# Patient Record
Sex: FEMALE | Race: ASIAN | Employment: UNEMPLOYED | URBAN - METROPOLITAN AREA
[De-identification: names, ages, dates, MRNs, and addresses within clinical notes are randomized per-mention and may not be internally consistent; named-entity substitution may affect disease eponyms.]

---

## 2020-09-30 ENCOUNTER — TRANSFERRED RECORDS (OUTPATIENT)
Dept: HEALTH INFORMATION MANAGEMENT | Facility: CLINIC | Age: 25
End: 2020-09-30

## 2020-10-09 ENCOUNTER — MEDICAL CORRESPONDENCE (OUTPATIENT)
Dept: HEALTH INFORMATION MANAGEMENT | Facility: CLINIC | Age: 25
End: 2020-10-09

## 2020-10-09 ENCOUNTER — TRANSFERRED RECORDS (OUTPATIENT)
Dept: HEALTH INFORMATION MANAGEMENT | Facility: CLINIC | Age: 25
End: 2020-10-09

## 2020-12-27 ENCOUNTER — TRANSCRIBE ORDERS (OUTPATIENT)
Dept: OTHER | Age: 25
End: 2020-12-27

## 2020-12-27 DIAGNOSIS — D66 FACTOR VIII DEFICIENCY (H): Primary | ICD-10-CM

## 2021-01-07 ENCOUNTER — VIRTUAL VISIT (OUTPATIENT)
Dept: HEMATOLOGY | Facility: CLINIC | Age: 26
End: 2021-01-07
Attending: PHYSICIAN ASSISTANT
Payer: COMMERCIAL

## 2021-01-07 VITALS — WEIGHT: 175 LBS

## 2021-01-07 DIAGNOSIS — D66 FACTOR VIII DEFICIENCY (H): ICD-10-CM

## 2021-01-07 PROCEDURE — 99202 OFFICE O/P NEW SF 15 MIN: CPT | Mod: 95 | Performed by: PHYSICIAN ASSISTANT

## 2021-01-07 PROCEDURE — 999N001193 HC VIDEO/TELEPHONE VISIT; NO CHARGE

## 2021-01-07 NOTE — PROGRESS NOTES
Patient was contacted to complete the pre-visit call prior to their video visit with the provider.  The following statement was read:       This visit will be billed to your insurance the same as an in-person visit. Because of Coronavirus we are instituting video visits when possible to keep everyone safe. This video visit will be conducted between you and the provider.  This service lets us provide the care you need with a video conversation.  If a prescription is necessary, we can send it directly to your pharmacy.If lab work or other testing is needed, we can help arrange a place/time for that to be done at a later date.If during the course of the call the provider feels a video visit is not appropriate, then your insurance company will not be billed.       Allergies and medications were reviewed and travel screening complete.     I thanked them for their time to cover this information     Salomón Leblanc CMA

## 2021-01-07 NOTE — PROGRESS NOTES
[facility]  [facility]  [facility], [place] [address]  Main: [phone], Fax: [phone]      Patient: Liam Patricio  MRN: [id number]  : 1995  KAREN: 2021  Video visit done today due to COVID-19.  Patient at home and accessed through [employer], provider in clinic.  Start time: 9:00am, end time 9:25am.    Reason:  Evaluation of abnormal coagulation testing done through [facility] for easy bruising.    HPI:  Liam is a 24 yo female who was recently evaluated for bruising.  She states she has had easy bruising her whole life and recently she noted increase in bruising in places where she should not have bruises like her hands and legs from wearing tight pants. She discussed this with her primary care provider who ordered labs which show borderline von Willebrand levels and low factor VIII level.      She states she has not had bleeding issues in her life.  No nosebleeds, no gum bleeding. Her periods have always been irregular and at times heavy.  She was on oral estrogen to control her periods for about a year and this helped control her cycles.  She had wisdom teeth out and states she felt she did not bleed more and expected.  She has had no surgeries or injuries.      Past Medical History:  No past medical history on file.    Past Surgical History:  No past surgical history on file.    Medications:  No current outpatient medications on file.      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  She was born in [place] and then lived in [place] and [place] before moving to [place].     Family History:  States she has an older and younger brother.  Neither have bleeding issues.  Older brother had knee surgery without bleeding.  Mother had two  deliveries and did not report extra bleeding.     Objective:  Video visit done today due to COVID-19.   No exam done  today    Labs:  Review of outside labs:  CBC: WBC: 5.2, hgb: 13.3, platelet count: 248  10/1/2020: INR 1.0, PT 9.8  PTT: 36 (N= 22-34), factor VIII: 32%, Von Willebrand antigen: 53%, Ristocetin: 56%  TT: 17 sec, Factor IX: 90%, factor XI: 101%, Factor XII: 80%    Assessment:  Liam is a 24 yo female with history of lifelong easy bruising without other bleeding issues.  She denies other bleeding issues but has had limited hemostatic challenges.  No reported family history of bleeding disorder or bleeding issues.    Labs done show low factor VIII level with borderline von Willebrand levels. Differential is: mild hemophilia A vs. Type 2N von Willebrand disease.  Also possible repeat labs will be in the normal range.    Plan:  1. Will repeat von Willebrand testing and add factor VIII binding assay to rule out type 2N von Willebrand disease.  Lab appointment made for 1/12/2021  2. Follow-up appointment made for 2/11/2021 to discuss results.  Briefly discussed therapy options for bleeding disorders and also reviewed that her bleeding phenotype is very mild and she should not need therapy unless has surgery or major injury.    3. Reviewed that estrogen can increase factor VIII/von Willebrand levels and thus may be helpful to her to control menstrual cycles both from a regulatory standpoint and by increasing clotting factors.     Patient understands and agrees with the above plan and recommendation.    Total Time Spent:  25 minutes, all 25 minutes was spent on video visit discussing recent abnormal lab testing.        Nadja Ward MPH, PA-C  [facility]  [facility]  [phone] main line  [phone] pager  [phone] fax

## 2021-01-09 ENCOUNTER — HEALTH MAINTENANCE LETTER (OUTPATIENT)
Age: 26
End: 2021-01-09

## 2021-01-12 DIAGNOSIS — D66 FACTOR VIII DEFICIENCY (H): ICD-10-CM

## 2021-01-12 LAB
ERYTHROCYTE [DISTWIDTH] IN BLOOD BY AUTOMATED COUNT: 11.6 % (ref 10–15)
HCT VFR BLD AUTO: 38.6 % (ref 35–47)
HGB BLD-MCNC: 12.3 G/DL (ref 11.7–15.7)
MCH RBC QN AUTO: 30.4 PG (ref 26.5–33)
MCHC RBC AUTO-ENTMCNC: 31.9 G/DL (ref 31.5–36.5)
MCV RBC AUTO: 95 FL (ref 78–100)
MISCELLANEOUS TEST: NORMAL
PLATELET # BLD AUTO: 204 10E9/L (ref 150–450)
RBC # BLD AUTO: 4.05 10E12/L (ref 3.8–5.2)
WBC # BLD AUTO: 3.5 10E9/L (ref 4–11)

## 2021-01-12 PROCEDURE — 85027 COMPLETE CBC AUTOMATED: CPT | Performed by: PATHOLOGY

## 2021-01-12 PROCEDURE — 85246 CLOT FACTOR VIII VW ANTIGEN: CPT | Mod: 90 | Performed by: PATHOLOGY

## 2021-01-12 PROCEDURE — 85240 CLOT FACTOR VIII AHG 1 STAGE: CPT | Mod: 90 | Performed by: PATHOLOGY

## 2021-01-12 PROCEDURE — 85390 FIBRINOLYSINS SCREEN I&R: CPT | Mod: 90 | Performed by: PATHOLOGY

## 2021-01-12 PROCEDURE — 84999 UNLISTED CHEMISTRY PROCEDURE: CPT | Mod: 90 | Performed by: PATHOLOGY

## 2021-01-12 PROCEDURE — 36415 COLL VENOUS BLD VENIPUNCTURE: CPT | Performed by: PATHOLOGY

## 2021-01-12 PROCEDURE — 85245 CLOT FACTOR VIII VW RISTOCTN: CPT | Mod: 90 | Performed by: PATHOLOGY

## 2021-01-13 LAB
FACT VIII ACT/NOR PPP: 76 % (ref 55–200)
VON WILLEBRAND INTERPRETATION: NORMAL
VWF CBA/VWF AG PPP IA-RTO: 63 % (ref 50–200)
VWF:AC ACT/NOR PPP IA: 66 % (ref 50–180)

## 2021-01-22 LAB — LAB SCANNED RESULT: NORMAL

## 2021-02-11 ENCOUNTER — VIRTUAL VISIT (OUTPATIENT)
Dept: HEMATOLOGY | Facility: CLINIC | Age: 26
End: 2021-02-11
Attending: PHYSICIAN ASSISTANT
Payer: COMMERCIAL

## 2021-02-11 VITALS — WEIGHT: 170 LBS

## 2021-02-11 DIAGNOSIS — D66 FACTOR VIII DEFICIENCY (H): Primary | ICD-10-CM

## 2021-02-11 PROCEDURE — 99212 OFFICE O/P EST SF 10 MIN: CPT | Mod: 95 | Performed by: PHYSICIAN ASSISTANT

## 2021-02-11 NOTE — PROGRESS NOTES
Patient was contacted to complete the pre-visit call prior to their video visit with the provider.      Allergies and medications were reviewed.    I thanked them for their time to cover this information     Salomón Leblanc CMA

## 2021-02-11 NOTE — PROGRESS NOTES
[facility]  [facility]otting [facility]  [facility], [address], [place] [address]  Main: [phone], Fax: [phone]      Patient: Liam Patricio  MRN: [id number]  : 1995  KAREN: 2021  Video visit done today due to COVID-19.  Patient at home and accessed through [employer], provider in clinic.  Start time: noon, end time 12:15pm.    Reason:  Evaluation of abnormal coagulation testing done through [facility] for easy bruising.  Follow-up visit to discuss lab results.    HPI:  Liam is a 24 yo female who was recently evaluated for bruising.  She states she has had easy bruising her whole life and recently she noted increase in bruising in places where she should not have bruises like her hands and legs from wearing tight pants. She discussed this with her primary care provider who ordered labs which show borderline von Willebrand levels and low factor VIII level.      She states she has not had bleeding issues in her life.  No nosebleeds, no gum bleeding. Her periods have always been irregular and at times heavy.  She was on oral estrogen to control her periods for about a year and this helped control her cycles.  She had wisdom teeth out and states she felt she did not bleed more and expected.  She has had no surgeries or injuries.      We reviewed today that she does have a bit of hypermobility in elbows, knees and wrists.     Past Medical History:  No past medical history on file.    Past Surgical History:  No past surgical history on file.    Medications:  No current outpatient medications on file.      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  She was born in [place] and then lived in [place] and [place] before moving to [place].     Family History:  States she has an older and younger brother.  Neither have bleeding issues.  Older brother had knee surgery without bleeding.  Mother had two   deliveries and did not report extra bleeding.     Objective:  Video visit done today due to COVID-19.   On video today we note hypermobility in wrists, knees and elbows.     Labs:  Review of outside labs:  CBC: WBC: 5.2, hgb: 13.3, platelet count: 248  10/1/2020: INR 1.0, PT 9.8  PTT: 36 (N= 22-34), factor VIII: 32%, Von Willebrand antigen: 53%, Ristocetin: 56%  TT: 17 sec, Factor IX: 90%, factor XI: 101%, Factor XII: 80%     Ref. Range 2021 09:45   WBC Latest Ref Range: 4.0 - 11.0 10e9/L 3.5 (L)   Hemoglobin Latest Ref Range: 11.7 - 15.7 g/dL 12.3   Platelet Count Latest Ref Range: 150 - 450 10e9/L 204   Factor 8 Assay Latest Ref Range: 55 - 200 % 76   von Willebrand Antigen Latest Ref Range: 50 - 200 % 63   von Willebrand Factor Activity Latest Ref Range: 50 - 180 % 66    2N testing is normal - type 2N von Willebrand has been ruled out.       Assessment:  Liam is a 26 yo female with history of lifelong easy bruising without other bleeding issues but limited hemostatic challenges.  No reported family history of bleeding disorder or bleeding issues.    Labs done at outside clinic revelealed low factor VIII level with borderline von Willebrand levels. Differential was: mild hemophilia A vs. Type 2N von Willebrand disease. Repeat labs reveal normal levels.  2N von Willebrand testing was normal.  Bleeding disorder in Liam has been ruled out.    Plan:  1. No further bleeding workup recommended.  Likely that outside labs were slightly low due to processing issue which happens with send out coagulation testing. She may have more bruising due to slight hypermobility noted on exam today.  Bleeding concerns have been minimal.   2. Will return to clinic as needed if bleeding issues arise.     Patient understands and agrees with the above plan and recommendation.    Total Time Spent:  15 minutes, all 15 minutes was spent on video visit discussing recent abnormal lab testing.        Nadja Ward MPH,  FRANCESCA  [facility]  [facility]  [phone] main line  [phone] pager  [phone] fax

## 2021-10-11 ENCOUNTER — HEALTH MAINTENANCE LETTER (OUTPATIENT)
Age: 26
End: 2021-10-11

## 2021-10-19 ENCOUNTER — HOSPITAL ENCOUNTER (EMERGENCY)
Facility: CLINIC | Age: 26
Discharge: HOME | End: 2021-10-19
Attending: EMERGENCY MEDICINE | Admitting: EMERGENCY MEDICINE
Payer: COMMERCIAL

## 2021-10-19 VITALS
TEMPERATURE: 98 F | DIASTOLIC BLOOD PRESSURE: 84 MMHG | WEIGHT: 173.1 LBS | SYSTOLIC BLOOD PRESSURE: 117 MMHG | RESPIRATION RATE: 16 BRPM | HEART RATE: 106 BPM | HEIGHT: 67 IN | OXYGEN SATURATION: 94 % | BODY MASS INDEX: 27.17 KG/M2

## 2021-10-19 DIAGNOSIS — F33.1 MAJOR DEPRESSIVE DISORDER, RECURRENT EPISODE, MODERATE (H): Primary | ICD-10-CM

## 2021-10-19 DIAGNOSIS — R45.851 SUICIDAL IDEATION: ICD-10-CM

## 2021-10-19 LAB — SARS-COV-2 RNA RESP QL NAA+PROBE: NEGATIVE

## 2021-10-19 PROCEDURE — C9803 HOPD COVID-19 SPEC COLLECT: HCPCS

## 2021-10-19 PROCEDURE — 99285 EMERGENCY DEPT VISIT HI MDM: CPT | Mod: 25

## 2021-10-19 PROCEDURE — 87635 SARS-COV-2 COVID-19 AMP PRB: CPT | Performed by: EMERGENCY MEDICINE

## 2021-10-19 PROCEDURE — 99203 OFFICE O/P NEW LOW 30 MIN: CPT | Performed by: NURSE PRACTITIONER

## 2021-10-19 PROCEDURE — 90791 PSYCH DIAGNOSTIC EVALUATION: CPT

## 2021-10-19 RX ORDER — BUPROPION HYDROCHLORIDE 150 MG/1
150 TABLET ORAL EVERY MORNING
COMMUNITY
End: 2021-10-19

## 2021-10-19 RX ORDER — CHOLECALCIFEROL (VITAMIN D3) 50 MCG
1 TABLET ORAL 2 TIMES DAILY
COMMUNITY

## 2021-10-19 RX ORDER — BUPROPION HYDROCHLORIDE 150 MG/1
300 TABLET ORAL EVERY MORNING
Qty: 30 TABLET | Refills: 0 | Status: SHIPPED | OUTPATIENT
Start: 2021-10-19

## 2021-10-19 RX ORDER — SERTRALINE HYDROCHLORIDE 25 MG/1
25 TABLET, FILM COATED ORAL DAILY
COMMUNITY

## 2021-10-19 ASSESSMENT — MIFFLIN-ST. JEOR
SCORE: 1557.81
SCORE: 1559.61

## 2021-10-19 ASSESSMENT — ENCOUNTER SYMPTOMS
COUGH: 0
NAUSEA: 0
FEVER: 0

## 2021-10-19 NOTE — ED TRIAGE NOTES
Pt reports increased depression over the past few months. Pt states that as of 1-2 weeks ago she came up with a plan to kill herself. Pt states that she would use carbon monoxide poisoning to kill herself.

## 2021-10-19 NOTE — CONSULTS
"10/19/2021  Liam Patricio 1995     [facility] Mental Health Assessment:    Started at: 1645   Completed at: 1725  What type of assessment are you doing today? Crisis assessment    1.  Presenting Problem:      Referral Method to ED? Family/Friends     What brings the patient to the ED today?   Pt is a 26 year old female with history of depression who presents to the ED due to concerns of safety and suicidal ideation. Pt is accompanied to the ED by her roommate, Tyra Newman (Micheal).  Pt reports she was studying with her friends at school and made a joke about killing herself. Pt reports everyone was commenting on how stressed and overwhelmed and Pt reports stating something along the lines of \"wanting things to stop.\" Pt reports when prompted by her friends about having suicidal thoughts or a plan, Pt states she told them it would \"not be hard to do\" and shared how she would buy a charcoal grill, close the windows, and cook like a \"little chicken.\" Pt reports the friends took her statement seriously and they talked to her about going to the ED and Pt reports being hesitant at first, yet ultimately decided to come due to believing it would stop her friends from bothering her about the comments and would end the conversation. Pt reports her friends informed her roommate Micheal about their concerns and had Micheal bring Pt in.  Pt reports a previous diagnosis of depression and has established outpatient services for her mental health. Pt reports taking medication for it yet states it may not be helpful and beliefs it may be attributing to her mental health symptoms. Pt reports recent stressors of her living situation with her roommate, who she has been with romantically for the past 8 years yet has recently ended that relationship for another relationship which has recently ended. Pt denies substance abuse and only drinks socially with her friends once every two weeks, and infrequently smokes marijuana, with the most recent " "being two days ago.  Pt endorses symptoms of depression such as low energy, isolating, difficulty concentrating, low mood, excessive crying, and having no motivation. Pt denies active SI/HI, AH/VH. Pt reports being overwhelmed and unsure how to manage her symptoms.    The following information was received from Tyra Cavanaugh) whose relationship to the patient is roommate and past significant other. Information was obtained in the family consult room. Their phone number is [phone] and they last had contact with patient on 10/19/2021.    What happened today: He shared that a lot of stuff has happened leading up to today. He shared he could tell she was down and had heard from her friends on from the [employer] trip that she was down during that time as well. He shared that she has her ups and downs with her depression. He shared she was at a down this morning and her friends thought it was going on for too long and were concerned regarding the statement she made about buying a charcoal grill as means to completing suicide. He reports she has been having mental breakdowns daily, 2-3 times a day, crying for 20-30 minutes at a time due to \"being unable to cope with herself.\" After Pt shared the comments she made about her suicidal ideation to him, he was encouraged by her friends to bring Pt to the ED to have her seek out help for her mental health.    What is different about patient's functioning: He shared she has been taking a couple of different medications and since then has been exhibiting a lot more break downs, more anxiety and easily triggered by stuff. He reports she has not been getting quality sleep due to stress of school, not being able to concentrate or engage in self-care, and reports she is unable to focus.    Concern about alcohol/drug use: No    What do you think the patient needs: He believes she needs to be set with long term treatment with the hopes of getting better. He believes individual " therapy and psychiatry would be beneficial for her.    Has patient made comments about wanting to kill themselves/others:  Yes He shared that Pt made a comment of buying a charcoal grill and using it to die by carbon monoxide.    If d/c is recommended, can they take part in safety/aftercare planning: Yes He shared he is willing to discuss safety concerns and help coordinate with her about spending time with others if she feels unsafe or does not want to be alone. He also reports being willing to bring her back to ED if concerned for her safety. He shared that Pt typically comes to him and shares how she is feeling with him.    Has this happened before? 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 Pt reports she started seeing a therapist for her depression in January/February 2021 through the [facility].    Additional Stressors: Pt identifies school as a stressor, working as an intern at two locations, and having complications with her romantic relationships.     2.  Risk Assessment:  Suicide and Self-Harm    ESS-6  1.a. Over the past 2 weeks, have you had thoughts of killing yourself? Yes   1.b. Have you ever attempted to kill yourself and, if yes, when did this last happen? No  2. Recent or current suicide plan? No - charcoal, occasionally drink, social, 2 days ago in [place]  3. Recent or current intent to act on ideation? No - not yet, marilyn what happens in the future or tomorrow, my depression so before it used to be more down, but now, month of September more swinging back and forth, now less many highs, more lows and/or not as sad.  4. Lifetime psychiatric hospitalization? No  5. Pattern of excessive substance use? No  6. Current irritability, agitation, or aggression? No  ESS-6 Score: 1    SI: Passive  Plan: No - Pt reports when prompted by her friends regarding how she  "would complete suicide, Pt stated it \"wouldn't be hard\" and discussed how she could buy a charcoal grill, close the windows, and cook herself like a \"little chicken.\"  Intent: No   Prior Attempts: No     Protective Factors: Pt reports living with someone is a protective factor. Pt has established outpatient services. Pt stated even if she lived alone, she does not think she would commit suicide. Pt reports having friends from school who she receives support from.     Hopes and goals for the future: Pt reports wanting to graduate and find a pharmacy job in a managed care unit.    Coping Skills: What helps and doesn't help? Pt reports she used to listen to specific music to help, yet has found it to be less effective lately. Pt reports using social media applications such as [employer] and [employer], yet does not have a lot of motivation to do so. Pt reports she ruddy until she is tired and then takes a nap.     Additional Risk Factors Related to Safety and Suicide: Yes: Depressive symptoms, Isolation and Recent loss    Is the patient engaged in self injurious behaviors? No     Risk to Others    Aggressive/Assaultive/Homicidal Risk Factors: No     Duty to Warn? No     Was a Child Protection Report Made? No       Was a Adult Protection Report Made? No        Sexually inappropriate behavior? No        Vulnerability to sexual exploitation? No     Additional information: NA      3. Mental Health Symptoms and Substance Use  Current Symptoms and Mental Health History    GAIN Short Screener (GAIN-SS) administered? NA    Attention, Hyperactivity, and Impulsivity Symptoms      Patient reported symptoms related to hyperactivity, inattention, or impulsivity? No  Pt reports difficulty concentrating, yet attributes it to her depressive symptoms.    Anxiety Symptoms    Patient reported anxiety symptoms? Yes: Generalized Symptoms: Cognitive anxiety - feelings of doom, racing thoughts, difficulty concentrating , Excessive worry and Other: " jaw clenching         Behavioral Difficulties    Patient reported behavioral difficulties? Yes: Impulsivity/Disinhibition   Pt reports she has been making impulsive decisions such as ending a relationship recently and going on a trip to [place] with her friends over the weekend before a week of exams.     Mood Symptoms    Patient reported mood disorder symptoms? Yes: Crying or feels like crying, Feelings of helplessness , Feelings of hopelessness , Feelings of worthlessness , Impaired concentration, Impaired decision making , Isolative , Loss of interest / Anhedonia , Low self esteem , Sad, depressed mood  and Thoughts of suicide/death        Eating Disorders and Appetite Disturbance      Patient reported appetite symptoms? No     SCOFF  Do you make yourself sick (induce vomiting) because you feel uncomfortably full? No   Do you worry that you have lost Control over how much you eat? No  Have you recently lost more than 14 lb in a three-month period? No   Do you think you are too fat, even though others say you are too thin? No   Would you say that food dominates your life? No  SCOFF Score: 0    Patient reported appetite or eating disorder symptoms? No      Interpersonal Functioning     Patient reported difficulties that may be associated with personality and interpersonal functioning? No      Learning Disabilities/Cognitive/Developmental Disorders    Patient reported concerns related to learning disabilities, cognitive challenges, and/or developmental disorders? No       General Cognitive Impairments    Patient reported symptoms of cognitive impairments? No      Sleep Disturbance    Patient reported difficulties with sleep? Yes: Difficulty falling asleep , Difficulty staying sleep  and Excessive sleep    Pt reports she experienced some manic symptoms a couple of weeks ago and was having difficulty getting sleep.     Psychosis Symptoms    Patient reported symptoms of psychosis? No        Trauma and Post-Traumatic  "Stress Disorder    Physical Abuse: No   Emotional/Psychological Abuse: No  Sexual Abuse: No  Loss of a friend or family member to suicide: No  Other Traumatic Event: Yes Pt reports her father may have a mental health problem. Pt reports he was an angry person when Pt was a child.     Patient reported trauma related symptoms? No       Impact of Mental Health on Functioning      Negative Impact Score: 6.57   Subjective Impact on functioning: Pt reports she has been skipping showers when she is feeling lazy, and Pt reports when she gets really lazy, she will not eat and give up on studying, which is out of the ordinary for her. Pt reports she has been getting tired of checking her phone for messages and emails and has been procrastinating overall.  How do symptoms vary from baseline? \"Usually not on top of my stuff, but I would at least study before an exam.\" Pt reports having an exam tomorrow and has not studied for it.    Current and Historical Substance Use Note:    IIs there a history of, or current, substance use? Yes: Substance #1: Name(s): Marijuana Frequency: infrequent Last use: 2 days ago in [place].    Have you been to chemical dependency treatment or detox before? No     CAGE-AID    Have you felt you ought to cut down on your drinking or drug use? No     Have people annoyed you by criticizing your drinking or drug use? No   Have you felt bad or guilty about your drinking or drug use? No  Have you ever had a drink or used drugs first thing in the morning to steady your nerves or to get rid of a hangover? No   CAGE-AID Score: 0/4    Drug screen completed? No   BAL/Breathalyzer completed? No       Mental Status Exam:    Affect: Flat  Appearance: Appropriate   Attention Span/Concentration: Attentive    Eye Contact: Engaged  Fund of Knowledge: Appropriate   Language /Speech Content: Fluent  Language /Speech Volume: Normal   Language /Speech Rate/Productions: Normal   Recent Memory: Intact  Remote Memory: " Intact  Mood: Depressed   Orientation:   Person: Yes   Place: Yes  Time of Day: Yes   Date: Yes   Situation (Do they understand why they are here?): Yes   Psychomotor Behavior: Normal   Thought Content: Clear and Other: passive suicidal ideation comments.  Thought Form: Intact    4. Social and Environmental Conditions   Is the patient their own guardian? Yes    Living Situation: With others: Pt lives in an apartment with Micheal (roommate, ex-significant other) apartment with micheal    Support system and quality of connections: micheal, stable support, I know I have a lot of people that care about me, I just have the thought that, I know no one likes to hear about my negative energy, I might overwhelm them.    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    Issues with employment or education: Yes Pt reports not having enought time to get everything done and not having the motivation.    Legal Concerns  Do you have any history of or current involvement with the legal system? No    Spiritual and Cultural Influences  Do you have any Hindu beliefs that are important in your life? No     Do you have any cultural influences in your life that impact your mental health care? No       5. Psychiatric History, Medical History, and Current Care      Patient Mental Health Services   Does the patient have a history of mental health concerns/diagnoses? Yes MDD and JOSE ALFREDO in January 2021.     Current Providers  Primary Care Provider: Yes [facility], and she prescribes Pt's medication.  Psychiatrist: No Pt reports she has an appointment lined up for November 8th, 2021.   Therapist: Yes JOSEP Marx, MPA, LICSW at [facility].   ARMHS: No   ACT Team: No   Other: No    History of Commitment? No  History of Psychiatric Hospitalizations? No   History of programmatic care? No    Family Mental Health History   Family History of Mental  Health or Chemical Dependency Issues? No     Development and Physical Health Challenges  Delays or concerns meeting developmental milestones? No  Current psychotropic medications? Yes Wellbutrin, Zoloft   Medication Compliant? Yes   Recent medication changes? Yes    History of concussion or TBI? No     Additional Information: NA    6. Collateral Information and Collaboration    Collaboration with medical staff:Referral Information:   Medical Records, Psychiatry and Nursing     Collateral Information/Sources: Friend: Tyra Newman (Micheal) - roommate, ex-significant other.    7. Assessment and Diagnosis  Assessment of patient strengths and vulnerabilities    Strengths, Protective Factors, & Community Resources: Pt reports living with someone is a protective factor. Pt has established outpatient services. Pt stated even if she lived alone, she does not think she would commit suicide. Pt reports having friends from school who she receives support from. Pt reports wanting to graduate and find a pharmacy job in a managed care unit.    Patient skills, abilities, and coping skills (what is going well?): Pt reports she used to listen to specific music to help, yet has found it to be less effective lately. Pt reports using social media applications such as [employer] and [employer], yet does not have a lot of motivation to do so. Pt reports she ruddy until she is tired and then takes a nap.     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    Diagnosis  F33.1 Major depressive disorder, recurrent episode, moderate    8.Therapeutic Methodologies Utilized in Assessment    Psychotherapy techniques and/or interventions used: Establishing rapport, Active listening, Assess dimensions of crisis, Apply solution-focused therapy to address current crisis, Identify additional supports and alternative coping skills, Establish a discharge plan and Safety  planning    9. Patient Care/Treatment Plan  Summary of Patient Presentation and needs  What are the basic needs for this patient in this moment? Assess suicidal ideation and risk level, medication management.      Consultations :  Attending provider consulted? Yes  Attending Name: Lynn Treviño   Attending concurs with disposition? Yes     Recommended disposition: Individual Therapy and Medication Management     Does the patient agree with the recommended level of care? Yes    Final disposition: Individual therapy  and Medication management    Disposition Details: Pt has established outpatient services and will follow through with scheduled appointments. Medication adjustment.    If Inpatient, is patient admitted voluntary? N/A   Patient aware of potential for transfer if there is not appropriate placement? NA  Patient is willing to travel outside of the [facility] for placement? NA   Central Intake Notified? NA    10. Patient Care Document: Safety and After Care Planning:          Safety Plan Provided?     If I am feeling unsafe or I am in a crisis, I will:   Contact my established care providers   Call the National Suicide Prevention Lifeline: [phone]   Go to the nearest emergency room   Call 911            Warning signs that I or other people might notice when a crisis is developing for me: being unresponsive in messages, dark humor.     Things I am able to do on my own to cope or help me feel better: playing games, crying out loud, sleeping.      Things that I am able to do with others to cope or help me better: go out with friends to hang out, do other activities with them.      Things I can use or do for distraction: Games, puppies, naps.      Changes I can make to support my mental health and wellness: Try to open up with people, embracing myself and my emotions.      People in my life that I can ask for help: family, friends, partner      Your [facility] has a mental health crisis team you can call 24/7: [place]  [facility] Crisis Line Number: [phone]       Other things that are important when I m in crisis: I don't want people to tell me what to do; I just want someone to hold my hand, give me a hug, be a shoulder to lean on (someone to be next to me so I don't feel alone)      Additional resources and information: Continue to follow through with established outpatient services.     Follow-Up Plan:  After care plan provided to the patient/guardian by: Saleem RN at discharge.  After care plan provided to any additional sources/parties? No    Duration of face to face time with patient in minutes: .50 hrs    CPT code(s) utilized: 89280 - Psychotherapy for Crisis - 60 (30-74*) min      Antwan Martinez [facility]

## 2021-10-19 NOTE — Clinical Note
Sintia was seen and treated in our emergency department on 10/19/2021.  She may return to school on 10/20/2021.      If you have any questions or concerns, please don't hesitate to call.      Lynn Treviño, APRN CNP

## 2021-10-19 NOTE — Clinical Note
Sintia was seen and treated in our emergency department on 10/19/2021.  She may return to school on 10/20/2021.  Please excuse Ms Patricio from morning class/exam on 10/20/21 due to late departure from the ED.     If you have any questions or concerns, please don't hesitate to call.      Lynn Treviño, APRN CNP

## 2021-10-19 NOTE — ED NOTES
"26 year old female with history of depression received from ED due to suicidal ideation. Pt reports \"I don't think I need to be here but my friends do\". Pt is able to contract for safety while at [facility]. Pt was vague about suicidal intent but stated that she doesn't want to die, she just wants to get away from everything. Pt reports that she was just joking with friends about suicide. Denies SI at this time, denies HI.     Nursing and risk assessments completed. Assessments reviewed with LMHP and physician. Video monitoring in progress, patient informed.  Admission information reviewed with patient. Patient given a tour of [facility] and instructions on using the facility. Questions regarding [facility] addressed. Pt search completed and belongings inventoried.  "

## 2021-10-19 NOTE — ED NOTES
Pt verbally contracted for safety with writer. Pt informed writer that she would tell her SO or staff if she began to feel increased thoughts of self harm. Charge RN informed and is arranging placement in the ED.

## 2021-10-19 NOTE — ED PROVIDER NOTES
"  History   Chief Complaint:  Suicidal       HPI   Liam Patricio is a 26 year old female with history of depression who presents with suicidal thoughts. The patient reports that she has been depressed for the past few months and that as of 1-2 weeks ago she came up with a plan to kill herself via carbon monoxide poisoning. She states she came into the ED today because her friends were worried she would kill herself in next 24 hours, but she states that she does not think she would go through with it. The patient denies any current or past history of self harm. She denies fever, nausea, chest pain and cough as well. She states she does not have any physical health concerns currently.     Review of Systems   Constitutional: Negative for fever.   Respiratory: Negative for cough.    Cardiovascular: Negative for chest pain.   Gastrointestinal: Negative for nausea.   Psychiatric/Behavioral: Positive for suicidal ideas. Negative for self-injury.   10 systems reviewed and negative except as above and in HPI.        Allergies:  The patient has no known allergies.     Medications:  Wellbutrin   Zoloft     Past Medical History:     Depression    Past Surgical History:    The patient denies past surgical history.     Family History:    The patient denies past family history.     Social History:  The patient presents with her significant other.     Physical Exam     Patient Vitals for the past 24 hrs:   BP Temp Temp src Pulse Resp SpO2 Height Weight   10/19/21 1615 117/84 98  F (36.7  C) Oral 106 16 94 % 1.702 m (5' 7\") 78.5 kg (173 lb 1.6 oz)   10/19/21 1432 (!) 116/90 97.8  F (36.6  C) Temporal 98 20 98 % 1.727 m (5' 8\") 77.1 kg (170 lb)       Physical Exam  General: Resting on the gurney, appears comfortable.  Well groomed  Head:  The scalp, face, and head appear normal  Mouth/Throat: Mucus membranes are moist  CV:  Regular rate    Normal S1 and S2  No pathological murmur   Resp:  Breath sounds clear and equal " bilaterally    Non-labored, no retractions or accessory muscle use    No coarseness    No wheezing   GI:  Abdomen is soft, no rigidity    No tenderness to palpation  MS:  No evidence of self injury, no lacerations.  No evidence of trauma.  Skin:   No lacerations  Neuro:  Speech is normal.     No apparent focal deficit.      Uses all extremities equally.  Psych:  Awake. Alert.  Flat affect, congruent with mood.      Appropriate interactions.    No current suicidal thoughts.     No thoughts of harming others.    Denies hallucinations, does not appear to be responding to internal stimuli.      Emergency Department Course     Laboratory:  Labs Ordered and Resulted from Time of ED Arrival Up to the Time of Departure from the ED   COVID-19 VIRUS (CORONAVIRUS) BY PCR - Normal    Narrative:     Testing was performed using the dano  SARS-CoV-2 & Influenza A/B Assay on the dano  Merced  System.  This test should be ordered for the detection of SARS-COV-2 in individuals who meet SARS-CoV-2 clinical and/or epidemiological criteria. Test performance is unknown in asymptomatic patients.  This test is for in vitro diagnostic use under the FDA EUA for laboratories certified under CLIA to perform moderate and/or high complexity testing. This test has not been FDA cleared or approved.  A negative test does not rule out the presence of PCR inhibitors in the specimen or target RNA in concentration below the limit of detection for the assay. The possibility of a false negative should be considered if the patient's recent exposure or clinical presentation suggests COVID-19.  [facility] are certified under the Clinical Laboratory Improvement Amendments of 1988 (CLIA-88) as qualified to perform moderate and/or high complexity laboratory testing.        Emergency Department Course:  Reviewed:  I reviewed nursing notes, vitals, past medical history and MIIC    Assessments:  1547 I obtained history and examined the patient  as noted above.     Disposition:  The patient was transferred to [facility].     Impression & Plan     Medical Decision Making:    Liam Patricio is a 26 year old female who presents for evaluation of suicidal ideation.     Although the patient has had increased suicidal thoughts, they have not had any actions of self harms.  They no signs at this point of suicide attempt or ingestion.    The patient was transferred to [facility] for further evaluation and care.       Diagnosis:    ICD-10-CM    1. Major depressive disorder, recurrent episode, moderate (H)  F33.1    2. Suicidal ideation  R45.851        Scribe Disclosure:  I, Paz Curry, am serving as a scribe at 4:18 PM on 10/19/2021 to document services personally performed by Leonor Guerra MD based on my observations and the provider's statements to me.          Leonor Guerra MD  10/19/21 3433

## 2021-10-20 NOTE — DISCHARGE INSTRUCTIONS
If I am feeling unsafe or I am in a crisis, I will:   Contact my established care providers   Call the National Suicide Prevention Lifeline: [phone]   Go to the nearest emergency room   Call [phone]          Warning signs that I or other people might notice when a crisis is developing for me: being unresponsive in messages, dark humor.    Things I am able to do on my own to cope or help me feel better: playing games, crying out loud, sleeping.     Things that I am able to do with others to cope or help me better: go out with friends to hang out, do other activities with them.     Things I can use or do for distraction: Games, puppies, naps.     Changes I can make to support my mental health and wellness: Try to open up with people, embracing myself and my emotions.     People in my life that I can ask for help: family, friends, partner     Your [facility] has a mental health crisis team you can call 24/7: [facility] Crisis Line Number: [phone]      Other things that are important when I m in crisis: I don't want people to tell me what to do; I just want someone to hold my hand, give me a hug, be a shoulder to lean on (someone to be next to me so I don't feel alone)     Additional resources and information: Continue to follow through with established outpatient services.

## 2021-10-20 NOTE — ED PROVIDER NOTES
[facility] Unit - Psychiatric Consultation  [facility] Emergency Department    Liam Patricio MRN: [id number]   Age: 26 year old YOB: 1995     History     Chief Complaint   Patient presents with     Suicidal     HPI  Liam Patricio is a 26 year old female with history notable for depression who presents to the ED for suicidal ideation.  Patient's friends recommended she be evaluated as she was making suicidal comments.  She was medically cleared in the ED and transferred to the [facility] unit for psychiatric evaluation.  On interview, patient describes a history of depression starting at the beginning on the year.  She endorses symptoms of low mood, sadness, guilt, low energy, no drive, wanting to isolate and finding no srinivas in life.  In April, she was started on Wellbutrin  mg which she notes was overall helpful until she experienced increased interpersonal relationship stress.  [facility] and was started on Zoloft 25 mg with instructions to increase to 50 mg after a week. Patient believes this occurred sometime in September. She notes she did not tolerate the increase in Zoloft well. She experienced increased anxiety, restlessness, difficulty sleeping, and irritability after being on 50 mg for a week thus she decreased the dose back to 25 mg as has been tolerating it fine.  She is now also experiencing new interpersonal stressors. She shares on Thursday she broke off a relationship with a fidel she has been dating for the past month and a half. She felt like he was losing interest in the relationship thus she chose to abruptly end it. She is now regretting this decision and grieving the loss of the relationship. The situation is complicated further that she lives with a gentleman who use to be her boyfriend, but whom she broke up with to pursue the relationship with the fidel she just ended.      Past Medical History  No past medical history on file.  No past surgical  "history on file.  buPROPion (WELLBUTRIN XL) 150 MG 24 hr tablet  sertraline (ZOLOFT) 25 MG tablet  vitamin D3 (CHOLECALCIFEROL) 50 mcg (2000 units) tablet      No Known Allergies  Family History  No family history on file.  Social History   Social History     Tobacco Use     Smoking status: Not on file   Substance Use Topics     Alcohol use: Not on file     Drug use: Not on file      Past medical history, past surgical history, medications, allergies, family history, and social history were reviewed with the patient. No additional pertinent items.       Review of Systems  A complete review of systems was performed with pertinent positives and negatives noted in the HPI, and all other systems negative.    Physical Examination   BP: (!) 116/90  Pulse: 98  Temp: 97.8  F (36.6  C)  Resp: 20  Height: 172.7 cm (5' 8\")  Weight: 77.1 kg (170 lb)  SpO2: 98 %    Physical Exam  General: Appears stated age.   Neuro: Alert and fully oriented. Extremities appear to demonstrate normal strength on visual inspection.   Integumentary/Skin: no rash visualized, normal color    Psychiatric Examination   Appearance: awake, alert  Attitude:  guarded and more cooperative  Eye Contact:  good  Mood:  depressed  Affect:  mood congruent and intensity is blunted  Speech:  clear, coherent  Psychomotor Behavior:  no evidence of tardive dyskinesia, dystonia, or tics, intact station, gait and muscle tone and physical retardation  Thought Process:  logical, linear and goal oriented  Associations:  no loose associations  Thought Content:  no evidence of suicidal ideation or homicidal ideation and no evidence of psychotic thought  Insight:  good  Judgement:  intact  Oriented to:  time, person, and place  Attention Span and Concentration:  intact  Recent and Remote Memory:  intact  Language: able to name/identify objects without impairment  Fund of Knowledge: intact with awareness of current and past events    ED Course        Labs Ordered and Resulted " from Time of ED Arrival Up to the Time of Departure from the ED   COVID-19 VIRUS (CORONAVIRUS) BY PCR - Normal    Narrative:     Testing was performed using the dano  SARS-CoV-2 & Influenza A/B Assay on the dano  [employer]  System.  This test should be ordered for the detection of SARS-COV-2 in individuals who meet SARS-CoV-2 clinical and/or epidemiological criteria. Test performance is unknown in asymptomatic patients.  This test is for in vitro diagnostic use under the FDA EUA for laboratories certified under CLIA to perform moderate and/or high complexity testing. This test has not been FDA cleared or approved.  A negative test does not rule out the presence of PCR inhibitors in the specimen or target RNA in concentration below the limit of detection for the assay. The possibility of a false negative should be considered if the patient's recent exposure or clinical presentation suggests COVID-19.  [facility] are certified under the Clinical Laboratory Improvement Amendments of 1988 (CLIA-88) as qualified to perform moderate and/or high complexity laboratory testing.       Assessments & Plan (with Medical Decision Making)   Patient presenting with major depressive disorder with suicidal thoughts exacerbated by psychosocial stressors . Nursing notes reviewed noting no acute issues.     I have reviewed the assessment completed by the [facility].     After a period of working with the treatment team on the [facility] unit, the patient's mental state improved to allow a safe transition to outpatient care. After counseling on the diagnosis, work-up, and treatment plan, the patient was discharged. Close follow-up with a psychiatrist and/or therapist was recommended and community psychiatric resources were provided. Patient is to return to the ED if any urgent or potentially life-threatening concerns.     At the time of discharge, the patient's acute suicide risk was determined to be low due to the following  factors: Reduction in the intensity of mood/anxiety symptoms that preceded the admission, denial of suicidal thoughts, denies feeling helpless or helpless, not currently under the influence of alcohol or illicit substances, denies experiencing command hallucinations, no immediate access to firearms. The patient's acute risk could be higher if noncompliant with their treatment plan, medications, follow-up appointments or using illicit substances or alcohol. Protective factors include: social supports, stable housing, employment, school.    Preliminary diagnosis:    ICD-10-CM    1. Major depressive disorder, recurrent episode, moderate (H)  F33.1    2. Suicidal ideation  R45.851         Treatment Plan:  -Discharge home with follow up appointments for therapy and medication management.  -Increase Wellbutrin XR from 150 mg to 300 mg daily to target the vegative symptoms of depression. She presented with a blunted affect, somewhat psychomotor retardation, low motivation and difficulty concentrating.  -Continue Zoloft 25 mg daily as she did not tolerate a dose increase previously. Would be best to have close follow up if this were to be increased again in the future as she seems to be hypersensitive to serotonergic agents.   -Educated patient on importance of self forgiveness and self-esteem as she tends to un-necessarily carry a lot of guilt and feelings of inadequacy around. She was receptive to this and would benefit from continued therapy in these areas.    --  EMIL Yusuf CNP   [facility] EMERGENCY DEPT  EmPATH Unit  10/19/2021      Lynn Treviño APRN CNP  10/19/21 2018       Lynn Treviño APRN CNP  10/19/21 2019

## 2021-10-20 NOTE — ED NOTES
Patient agrees to discharge plan. Discharge instructions reviewed with patient including follow-up care plan. Medications sent to pt pharmacy of choice and letter to be excused from exam tomorrow morning. Reviewed safety plan and outpatient resources. Denies SI and HI. All belongings that were brought into the hospital have been returned to patient. Escorted off the unit at 2015 accompanied by [facility] staff. Discharged to home via roomate.

## 2022-01-30 ENCOUNTER — HEALTH MAINTENANCE LETTER (OUTPATIENT)
Age: 27
End: 2022-01-30

## 2022-09-24 ENCOUNTER — HEALTH MAINTENANCE LETTER (OUTPATIENT)
Age: 27
End: 2022-09-24

## 2023-05-08 ENCOUNTER — HEALTH MAINTENANCE LETTER (OUTPATIENT)
Age: 28
End: 2023-05-08

## 2024-05-11 ENCOUNTER — HEALTH MAINTENANCE LETTER (OUTPATIENT)
Age: 29
End: 2024-05-11